# Patient Record
Sex: FEMALE | Race: WHITE | NOT HISPANIC OR LATINO | URBAN - METROPOLITAN AREA
[De-identification: names, ages, dates, MRNs, and addresses within clinical notes are randomized per-mention and may not be internally consistent; named-entity substitution may affect disease eponyms.]

---

## 2017-12-12 ENCOUNTER — OFFICE VISIT (OUTPATIENT)
Dept: OBSTETRICS AND GYNECOLOGY | Facility: CLINIC | Age: 32
End: 2017-12-12
Payer: COMMERCIAL

## 2017-12-12 VITALS
SYSTOLIC BLOOD PRESSURE: 114 MMHG | WEIGHT: 231.5 LBS | HEIGHT: 67 IN | DIASTOLIC BLOOD PRESSURE: 82 MMHG | BODY MASS INDEX: 36.34 KG/M2

## 2017-12-12 DIAGNOSIS — Z30.011 ENCOUNTER FOR INITIAL PRESCRIPTION OF CONTRACEPTIVE PILLS: ICD-10-CM

## 2017-12-12 DIAGNOSIS — Z01.419 WELL WOMAN EXAM WITH ROUTINE GYNECOLOGICAL EXAM: Primary | ICD-10-CM

## 2017-12-12 DIAGNOSIS — Z11.3 SCREENING EXAMINATION FOR STD (SEXUALLY TRANSMITTED DISEASE): ICD-10-CM

## 2017-12-12 LAB
CANDIDA RRNA VAG QL PROBE: NEGATIVE
G VAGINALIS RRNA GENITAL QL PROBE: NEGATIVE
T VAGINALIS RRNA GENITAL QL PROBE: NEGATIVE

## 2017-12-12 PROCEDURE — 99999 PR PBB SHADOW E&M-NEW PATIENT-LVL II: CPT | Mod: PBBFAC,,, | Performed by: OBSTETRICS & GYNECOLOGY

## 2017-12-12 PROCEDURE — 87660 TRICHOMONAS VAGIN DIR PROBE: CPT

## 2017-12-12 PROCEDURE — 87591 N.GONORRHOEAE DNA AMP PROB: CPT

## 2017-12-12 PROCEDURE — 99395 PREV VISIT EST AGE 18-39: CPT | Mod: S$GLB,,, | Performed by: OBSTETRICS & GYNECOLOGY

## 2017-12-12 PROCEDURE — 87480 CANDIDA DNA DIR PROBE: CPT

## 2017-12-12 RX ORDER — NORGESTIMATE AND ETHINYL ESTRADIOL 0.25-0.035
1 KIT ORAL DAILY
Qty: 28 TABLET | Refills: 12 | Status: SHIPPED | OUTPATIENT
Start: 2017-12-12 | End: 2018-09-07 | Stop reason: SDUPTHER

## 2017-12-12 NOTE — PROGRESS NOTES
Subjective:       Patient ID: Linda Will is a 32 y.o. female.    Chief Complaint:  Contraception and Well Woman      History of Present Illness:  HPI  SUBJECTIVE:   32 y.o. female  here for annual.     She describes her periods as regular, lasting 5 days. normal flow.  denies break through bleeding.   denies vaginal itching or irritation.  denies vaginal discharge.    She is sexually active with 1 partner.  She uses oral contraceptives (estrogen/progesterone) for contraception.     History of abnormal pap: No  Last Pap: [facility] -   Last MMG: No  Last Colonoscopy:  No    FH: Denies family history of breast, GYN or colon cancer.    GYN & OB History  Patient's last menstrual period was 2017.   Date of Last Pap: No result found    OB History    Para Term  AB Living   0 0 0 0 0 0   SAB TAB Ectopic Multiple Live Births   0 0 0 0 0             Review of Systems  Review of Systems   Constitutional: Negative for chills, diaphoresis, fatigue and fever.   Respiratory: Negative for cough and shortness of breath.    Cardiovascular: Negative for chest pain and palpitations.   Gastrointestinal: Negative for abdominal pain, constipation, diarrhea, nausea and vomiting.   Genitourinary: Negative for dyspareunia, menstrual problem, pelvic pain, vaginal bleeding, vaginal discharge and vaginal pain.   Neurological: Negative for headaches.   Psychiatric/Behavioral: Negative for depression.   Breast: Negative for breast mass, breast pain, nipple discharge and skin changes          Objective:    Physical Exam:   Constitutional: She is oriented to person, place, and time. She appears well-developed and well-nourished. No distress.    HENT:   Head: Normocephalic and atraumatic.     Neck: Normal range of motion.     Pulmonary/Chest: Effort normal. She exhibits no mass and no tenderness. Right breast exhibits no inverted nipple, no mass, no nipple discharge, no skin change, no  tenderness and no swelling. Left breast exhibits no inverted nipple, no mass, no nipple discharge, no skin change, no tenderness and no swelling. Breasts are symmetrical.        Abdominal: Soft. She exhibits no distension. There is no tenderness.     Genitourinary:   Genitourinary Comments: Normal external female genitalia; vagina rugated, normal; cervix normal, no masses; uterus small mobile nontender; no adnexal masses palpated.           Musculoskeletal: Normal range of motion and moves all extremeties.       Neurological: She is alert and oriented to person, place, and time.    Skin: Skin is warm.    Psychiatric: She has a normal mood and affect. Her behavior is normal. Judgment and thought content normal.          Assessment:        1. Well woman exam with routine gynecological exam    2. Encounter for initial prescription of contraceptive pills    3. Screening examination for STD (sexually transmitted disease)             Plan:      Linda was seen today for contraception and well woman.    Diagnoses and all orders for this visit:    Well woman exam with routine gynecological exam  - Pap up to date per patient. Discussed screening guidelines and recommended pap/cotesting at next annual.   - MMG age 40.   - Contraception - Contraception options discussed including OCPs, Depo Provera, vaginal ring, hormone patch, IUD, Nexplanon. Patient would like to continue continuous OCPs. Rx sent to pharmacy.   - STD screening done. Patient declines HIV, RPR, hep.    Encounter for initial prescription of contraceptive pills    Screening examination for STD (sexually transmitted disease)  -     Vaginosis Screen by DNA Probe  -     C. trachomatis/N. gonorrhoeae by AMP DNA Cervix    Other orders  -     norgestimate-ethinyl estradiol (ORTHO-CYCLEN) 0.25-35 mg-mcg per tablet; Take 1 tablet by mouth once daily. Take hormone pills continuously.      Orders Placed This Encounter   Procedures    Vaginosis Screen by DNA Probe    C.  trachomatis/N. gonorrhoeae by AMP DNA Cervix       Return in about 1 year (around 12/12/2018) for annual.         Ayaka Chin MD  OB/GYN

## 2017-12-13 LAB
C TRACH DNA SPEC QL NAA+PROBE: NOT DETECTED
N GONORRHOEA DNA SPEC QL NAA+PROBE: NOT DETECTED

## 2018-09-07 RX ORDER — NORGESTIMATE AND ETHINYL ESTRADIOL 0.25-0.035
1 KIT ORAL DAILY
Qty: 28 TABLET | Refills: 6 | Status: SHIPPED | OUTPATIENT
Start: 2018-09-07 | End: 2019-02-16 | Stop reason: SDUPTHER

## 2018-10-30 ENCOUNTER — TELEPHONE (OUTPATIENT)
Dept: OBSTETRICS AND GYNECOLOGY | Facility: CLINIC | Age: 33
End: 2018-10-30

## 2018-10-30 NOTE — TELEPHONE ENCOUNTER
----- Message from Adriane Killian sent at 10/30/2018 11:35 AM CDT -----  Contact: Self  Pt is calling to be scheduled for an appt for STD Testing.   was unable to make the appt due to an exhausted template.    She can be reached at [phone].    Thank you.

## 2019-02-11 RX ORDER — NORGESTIMATE AND ETHINYL ESTRADIOL 0.25-0.035
1 KIT ORAL DAILY
Qty: 28 TABLET | Refills: 0 | Status: SHIPPED | OUTPATIENT
Start: 2019-02-11 | End: 2019-03-20

## 2019-02-16 RX ORDER — NORGESTIMATE AND ETHINYL ESTRADIOL 0.25-0.035
KIT ORAL
Qty: 28 TABLET | Refills: 6 | Status: SHIPPED | OUTPATIENT
Start: 2019-02-16 | End: 2019-03-20 | Stop reason: SDUPTHER

## 2019-03-20 ENCOUNTER — OFFICE VISIT (OUTPATIENT)
Dept: OBSTETRICS AND GYNECOLOGY | Facility: CLINIC | Age: 34
End: 2019-03-20
Payer: COMMERCIAL

## 2019-03-20 VITALS
SYSTOLIC BLOOD PRESSURE: 136 MMHG | HEIGHT: 68 IN | WEIGHT: 252.19 LBS | DIASTOLIC BLOOD PRESSURE: 80 MMHG | BODY MASS INDEX: 38.22 KG/M2

## 2019-03-20 DIAGNOSIS — Z01.419 WELL WOMAN EXAM WITH ROUTINE GYNECOLOGICAL EXAM: Primary | ICD-10-CM

## 2019-03-20 DIAGNOSIS — Z30.41 ENCOUNTER FOR SURVEILLANCE OF CONTRACEPTIVE PILLS: ICD-10-CM

## 2019-03-20 PROCEDURE — 99395 PR PREVENTIVE VISIT,EST,18-39: ICD-10-PCS | Mod: S$GLB,,, | Performed by: OBSTETRICS & GYNECOLOGY

## 2019-03-20 PROCEDURE — 99395 PREV VISIT EST AGE 18-39: CPT | Mod: S$GLB,,, | Performed by: OBSTETRICS & GYNECOLOGY

## 2019-03-20 PROCEDURE — 88175 CYTOPATH C/V AUTO FLUID REDO: CPT

## 2019-03-20 PROCEDURE — 87624 HPV HI-RISK TYP POOLED RSLT: CPT

## 2019-03-20 RX ORDER — NORGESTIMATE AND ETHINYL ESTRADIOL 0.25-0.035
1 KIT ORAL DAILY
Qty: 84 TABLET | Refills: 3 | Status: SHIPPED | OUTPATIENT
Start: 2019-03-20

## 2019-03-20 NOTE — PROGRESS NOTES
Subjective:       Patient ID: Linda Will is a 33 y.o. female.    Chief Complaint:  Annual Exam    History of Present Illness:  HPI  SUBJECTIVE:   33 y.o. female  here for annual. Planning to quit smoking on .    She describes her periods as regular, lasting 5 days, normal flow.  denies break through bleeding.   denies vaginal itching or irritation.  denies vaginal discharge.    She is sexually active with 1 partner.  She uses oral contraceptives (estrogen/progesterone) for contraception.     History of abnormal pap: No  Last Pap: [facility] -   Last MMG: No  Last Colonoscopy:  No    FH: Denies family history of breast, GYN or colon cancer.    GYN & OB History  Patient's last menstrual period was 03/15/2019.   Date of Last Pap: No result found    OB History    Para Term  AB Living   0 0 0 0 0 0   SAB TAB Ectopic Multiple Live Births   0 0 0 0 0             Review of Systems  Review of Systems   Constitutional: Negative for chills, diaphoresis, fatigue and fever.   Respiratory: Negative for cough and shortness of breath.    Cardiovascular: Negative for chest pain and palpitations.   Gastrointestinal: Negative for abdominal pain, constipation, diarrhea, nausea and vomiting.   Genitourinary: Negative for dyspareunia, menstrual problem, pelvic pain, vaginal bleeding, vaginal discharge and vaginal pain.   Integumentary:  Negative for breast mass, nipple discharge and breast skin changes.   Neurological: Negative for headaches.   Psychiatric/Behavioral: Negative for depression.   Breast: Negative for mass, mastodynia, nipple discharge and skin changes          Objective:    Physical Exam:   Constitutional: She is oriented to person, place, and time. She appears well-developed and well-nourished. No distress.    HENT:   Head: Normocephalic and atraumatic.     Neck: Normal range of motion.     Pulmonary/Chest: Effort normal. She exhibits no mass and no tenderness.  Right breast exhibits no inverted nipple, no mass, no nipple discharge, no skin change, no tenderness and no swelling. Left breast exhibits no inverted nipple, no mass, no nipple discharge, no skin change, no tenderness and no swelling. Breasts are symmetrical.        Abdominal: Soft. She exhibits no distension. There is no tenderness.     Genitourinary:   Genitourinary Comments: Normal external female genitalia; vagina rugated, normal; cervix normal, no masses; uterus small mobile nontender; no adnexal masses palpated.           Musculoskeletal: Normal range of motion and moves all extremeties.       Neurological: She is alert and oriented to person, place, and time.    Skin: Skin is warm.    Psychiatric: She has a normal mood and affect. Her behavior is normal. Judgment and thought content normal.          Assessment:        1. Well woman exam with routine gynecological exam    2. Encounter for surveillance of contraceptive pills             Plan:      Linda was seen today for contraception and well woman.    Diagnoses and all orders for this visit:    Well woman exam with routine gynecological exam  - Discussed pap guidelines. Pap/HPV collected.   - MMG age 40.   - Contraception - Continue OCPs.  - STD screening done recently per patient.    Other orders  -     norgestimate-ethinyl estradiol (ORTHO-CYCLEN) 0.25-35 mg-mcg per tablet; Take 1 tablet by mouth once daily. Take hormone pills continuously.    Orders Placed This Encounter   Procedures    HPV High Risk Genotypes, PCR       Follow-up in about 1 year (around 3/20/2020) for annual.

## 2019-03-25 LAB
HPV HR 12 DNA CVX QL NAA+PROBE: POSITIVE
HPV16 AG SPEC QL: POSITIVE
HPV18 DNA SPEC QL NAA+PROBE: NEGATIVE

## 2019-03-26 ENCOUNTER — PATIENT MESSAGE (OUTPATIENT)
Dept: OBSTETRICS AND GYNECOLOGY | Facility: CLINIC | Age: 34
End: 2019-03-26

## 2019-03-26 DIAGNOSIS — B37.31 YEAST VAGINITIS: Primary | ICD-10-CM

## 2019-03-26 DIAGNOSIS — N76.0 BV (BACTERIAL VAGINOSIS): ICD-10-CM

## 2019-03-26 DIAGNOSIS — B96.89 BV (BACTERIAL VAGINOSIS): ICD-10-CM

## 2019-03-26 RX ORDER — FLUCONAZOLE 150 MG/1
150 TABLET ORAL ONCE
Qty: 1 TABLET | Refills: 2 | Status: SHIPPED | OUTPATIENT
Start: 2019-03-26 | End: 2019-03-26

## 2019-03-26 RX ORDER — METRONIDAZOLE 500 MG/1
500 TABLET ORAL EVERY 12 HOURS
Qty: 14 TABLET | Refills: 0 | Status: SHIPPED | OUTPATIENT
Start: 2019-03-26 | End: 2019-04-02

## 2019-03-27 ENCOUNTER — PATIENT MESSAGE (OUTPATIENT)
Dept: OBSTETRICS AND GYNECOLOGY | Facility: CLINIC | Age: 34
End: 2019-03-27

## 2019-04-04 ENCOUNTER — PROCEDURE VISIT (OUTPATIENT)
Dept: OBSTETRICS AND GYNECOLOGY | Facility: CLINIC | Age: 34
End: 2019-04-04
Payer: COMMERCIAL

## 2019-04-04 VITALS
WEIGHT: 258.63 LBS | SYSTOLIC BLOOD PRESSURE: 110 MMHG | HEIGHT: 68 IN | BODY MASS INDEX: 39.2 KG/M2 | DIASTOLIC BLOOD PRESSURE: 74 MMHG

## 2019-04-04 DIAGNOSIS — Z23 NEED FOR HPV VACCINE: ICD-10-CM

## 2019-04-04 DIAGNOSIS — B97.7 HPV IN FEMALE: Primary | ICD-10-CM

## 2019-04-04 PROCEDURE — 57454 BX/CURETT OF CERVIX W/SCOPE: CPT | Mod: S$GLB,,, | Performed by: OBSTETRICS & GYNECOLOGY

## 2019-04-04 PROCEDURE — 57454 COLPOSCOPY: ICD-10-PCS | Mod: S$GLB,,, | Performed by: OBSTETRICS & GYNECOLOGY

## 2019-04-04 PROCEDURE — 88305 TISSUE SPECIMEN TO PATHOLOGY, OBSTETRICS/GYNECOLOGY: ICD-10-PCS | Mod: 26,,, | Performed by: PATHOLOGY

## 2019-04-04 PROCEDURE — 88305 TISSUE EXAM BY PATHOLOGIST: CPT | Mod: 26,,, | Performed by: PATHOLOGY

## 2019-04-04 PROCEDURE — 88305 TISSUE EXAM BY PATHOLOGIST: CPT | Performed by: PATHOLOGY

## 2019-04-04 RX ORDER — FLUCONAZOLE 150 MG/1
TABLET ORAL
Refills: 2 | COMMUNITY
Start: 2019-03-28

## 2019-04-04 NOTE — PROCEDURES
Colposcopy  Date/Time: 4/4/2019 2:47 PM  Performed by: Ayaka Chin MD  Authorized by: Ayaka Chin MD     Consent Done?:  Yes (Written)  Assistants?: Yes    List of assistants:  Cheri ALEGRIA was present for the entire procedure.    Colposcopy Site:  Cervix  Position:  Supine  Acrowhite Lesion? Yes (1:00)    Atypical Vessels? Yes (12:00 and 4:00)    Transformation Zone Adequate?: Yes    Biopsy?: Yes         Location:  Cervix ((4 00, 1 00 and 11 00))  ECC Performed?: Yes    LEEP Performed?: No    Estimated blood loss (cc):  10   Patient tolerated the procedure well with no immediate complications.   Post-operative instructions were provided for the patient.   Patient was discharged and will follow up if any complications occur

## 2019-04-05 ENCOUNTER — TELEPHONE (OUTPATIENT)
Dept: OBSTETRICS AND GYNECOLOGY | Facility: CLINIC | Age: 34
End: 2019-04-05

## 2019-04-05 ENCOUNTER — PATIENT MESSAGE (OUTPATIENT)
Dept: OBSTETRICS AND GYNECOLOGY | Facility: CLINIC | Age: 34
End: 2019-04-05

## 2019-04-05 DIAGNOSIS — B97.7 HPV (HUMAN PAPILLOMA VIRUS) INFECTION: Primary | ICD-10-CM

## 2019-04-12 ENCOUNTER — TELEPHONE (OUTPATIENT)
Dept: OBSTETRICS AND GYNECOLOGY | Facility: CLINIC | Age: 34
End: 2019-04-12

## 2019-04-12 NOTE — TELEPHONE ENCOUNTER
Spoke with patient about TAWANNA III colpo result. Recommended in office LEEP. Reviewed procedure and all questions answered. Scheduled for 4/18 at 915 am.

## 2019-04-16 ENCOUNTER — PATIENT MESSAGE (OUTPATIENT)
Dept: OBSTETRICS AND GYNECOLOGY | Facility: CLINIC | Age: 34
End: 2019-04-16

## 2019-04-18 ENCOUNTER — PROCEDURE VISIT (OUTPATIENT)
Dept: OBSTETRICS AND GYNECOLOGY | Facility: CLINIC | Age: 34
End: 2019-04-18
Payer: COMMERCIAL

## 2019-04-18 VITALS
WEIGHT: 255.5 LBS | SYSTOLIC BLOOD PRESSURE: 124 MMHG | DIASTOLIC BLOOD PRESSURE: 78 MMHG | BODY MASS INDEX: 38.72 KG/M2 | HEIGHT: 68 IN

## 2019-04-18 DIAGNOSIS — D06.9 CIN III (CERVICAL INTRAEPITHELIAL NEOPLASIA GRADE III) WITH SEVERE DYSPLASIA: ICD-10-CM

## 2019-04-18 DIAGNOSIS — D06.9 SEVERE DYSPLASIA OF CERVIX (CIN III): Primary | ICD-10-CM

## 2019-04-18 LAB
B-HCG UR QL: NEGATIVE
CTP QC/QA: YES

## 2019-04-18 PROCEDURE — 81025 URINE PREGNANCY TEST: CPT | Mod: S$GLB,,, | Performed by: OBSTETRICS & GYNECOLOGY

## 2019-04-18 PROCEDURE — 81025 POCT URINE PREGNANCY: ICD-10-PCS | Mod: S$GLB,,, | Performed by: OBSTETRICS & GYNECOLOGY

## 2019-04-18 PROCEDURE — 88307 TISSUE SPECIMEN TO PATHOLOGY, OBSTETRICS/GYNECOLOGY: ICD-10-PCS | Mod: 26,,, | Performed by: PATHOLOGY

## 2019-04-18 PROCEDURE — 88305 TISSUE SPECIMEN TO PATHOLOGY, OBSTETRICS/GYNECOLOGY: ICD-10-PCS | Mod: 26,,, | Performed by: PATHOLOGY

## 2019-04-18 PROCEDURE — 88305 TISSUE EXAM BY PATHOLOGIST: CPT | Performed by: PATHOLOGY

## 2019-04-18 PROCEDURE — 88305 TISSUE EXAM BY PATHOLOGIST: CPT | Mod: 26,,, | Performed by: PATHOLOGY

## 2019-04-18 PROCEDURE — 57461 PR COLPOSCOPY,CERVIX W/ADJ VAG,W/LOOP CONIZ: ICD-10-PCS | Mod: S$GLB,,, | Performed by: OBSTETRICS & GYNECOLOGY

## 2019-04-18 PROCEDURE — 57461 CONZ OF CERVIX W/SCOPE LEEP: CPT | Mod: S$GLB,,, | Performed by: OBSTETRICS & GYNECOLOGY

## 2019-04-18 PROCEDURE — 88307 TISSUE EXAM BY PATHOLOGIST: CPT | Mod: 26,,, | Performed by: PATHOLOGY

## 2019-04-21 NOTE — PROCEDURES
LEEP PROCEDURE:    33 year old female patient  with Patient's last menstrual period was 2019 (exact date). is here for LEEP.    DATA REVIEWED:  Last Pap Date: 3/20/19      Pap Result: NILM +HPV 16 and other high risk types  Last Colposcopy biopsy result : TAWANNA 3    PRE- LEEP PROCEDURE COUNSELING:  Risks of infection, bleeding, pain, injury to adjacent organs as well as future cervicalincompetence.  That this procedure does not guarantee to completely remove all abnormal cells and that dysplasia may reoccur in the future.  Alternatives to the LEEP procedure were discussed and the patient agrees to proceed.    PROCEDURE:  TIME OUT PERFORMED.  The cervix and transformation zone were visualized with a speculum and a local block was obtained with 15 cc of 1% lidocaine.  A large narrow size loop was selected.  The entire transformation zone was excised in one pass.  The base was cauterized with a ball cautery.  Monsels solution was applied to the base to obtain hemostasis and the speculum removed.  The specimen was placed in formalyn and sent to Pathology for a Histopathologic diagnosis. The patient tolerated the procedure well.    ASSESSMENT:  1.   Encounter Diagnoses   Name Primary?    Severe dysplasia of cervix (TAWANNA III) Yes    TAWANNA III (cervical intraepithelial neoplasia grade III) with severe dysplasia        POST LEEP PROCEDURE COUNSELING:  Manage post LEEP cramping with NSAIDs or Tylenol or Rx per [facility].  Avoid anything in vagina (intercourse, douching, tampons) two weeks after the Procedure.  Expect a watery grey to yellow vaginal discharge for several weeks.  Limit activity for 2 weeks.  Report bleeding heavier than a period, worsening pain, fever > 101.0 F, or foul-smelling vaginal discharge.  Importance of follow-up stressed.    Counseling lasted approximately 15 minutes and all her questions were answered.    FOLLOW-UP based on pathology results.

## 2019-04-26 ENCOUNTER — PATIENT MESSAGE (OUTPATIENT)
Dept: OBSTETRICS AND GYNECOLOGY | Facility: CLINIC | Age: 34
End: 2019-04-26

## 2019-05-01 ENCOUNTER — CLINICAL SUPPORT (OUTPATIENT)
Dept: OBSTETRICS AND GYNECOLOGY | Facility: CLINIC | Age: 34
End: 2019-05-01
Payer: COMMERCIAL

## 2019-05-01 PROCEDURE — 90651 9VHPV VACCINE 2/3 DOSE IM: CPT | Mod: S$GLB,,, | Performed by: OBSTETRICS & GYNECOLOGY

## 2019-05-01 PROCEDURE — 90471 IMMUNIZATION ADMIN: CPT | Mod: S$GLB,,, | Performed by: OBSTETRICS & GYNECOLOGY

## 2019-05-01 PROCEDURE — 99999 PR PBB SHADOW E&M-EST. PATIENT-LVL II: CPT | Mod: PBBFAC,,,

## 2019-05-01 PROCEDURE — 99999 PR PBB SHADOW E&M-EST. PATIENT-LVL II: ICD-10-PCS | Mod: PBBFAC,,,

## 2019-05-01 PROCEDURE — 90651 HPV VACCINE 9-VALENT 3 DOSE IM: ICD-10-PCS | Mod: S$GLB,,, | Performed by: OBSTETRICS & GYNECOLOGY

## 2019-05-01 PROCEDURE — 90471 HPV VACCINE 9-VALENT 3 DOSE IM: ICD-10-PCS | Mod: S$GLB,,, | Performed by: OBSTETRICS & GYNECOLOGY

## 2019-05-01 NOTE — PROGRESS NOTES
Here for Gardasil # 1 injection, without complaint at this time. Reports no pain before or after injection. Advised to wait in lobby 15 minutes after injection and report any adverse reactions. Return to clinic in          for next injection.         Site: LD

## 2019-05-16 ENCOUNTER — IMMUNIZATION (OUTPATIENT)
Dept: PHARMACY | Facility: CLINIC | Age: 34
End: 2019-05-16
Payer: COMMERCIAL

## 2019-05-16 ENCOUNTER — OFFICE VISIT (OUTPATIENT)
Dept: INTERNAL MEDICINE | Facility: CLINIC | Age: 34
End: 2019-05-16
Payer: COMMERCIAL

## 2019-05-16 VITALS
HEART RATE: 75 BPM | OXYGEN SATURATION: 99 % | DIASTOLIC BLOOD PRESSURE: 80 MMHG | BODY MASS INDEX: 39.42 KG/M2 | HEIGHT: 68 IN | WEIGHT: 260.13 LBS | TEMPERATURE: 98 F | SYSTOLIC BLOOD PRESSURE: 120 MMHG

## 2019-05-16 DIAGNOSIS — R06.83 SNORING: ICD-10-CM

## 2019-05-16 DIAGNOSIS — Z87.891 QUIT SMOKING WITHIN PAST YEAR: ICD-10-CM

## 2019-05-16 DIAGNOSIS — Z00.00 ANNUAL PHYSICAL EXAM: Primary | ICD-10-CM

## 2019-05-16 DIAGNOSIS — Z71.85 VACCINE COUNSELING: ICD-10-CM

## 2019-05-16 DIAGNOSIS — E66.9 OBESITY (BMI 35.0-39.9 WITHOUT COMORBIDITY): ICD-10-CM

## 2019-05-16 PROCEDURE — 99999 PR PBB SHADOW E&M-EST. PATIENT-LVL IV: CPT | Mod: PBBFAC,,, | Performed by: INTERNAL MEDICINE

## 2019-05-16 PROCEDURE — 99385 PR PREVENTIVE VISIT,NEW,18-39: ICD-10-PCS | Mod: S$GLB,,, | Performed by: INTERNAL MEDICINE

## 2019-05-16 PROCEDURE — 99385 PREV VISIT NEW AGE 18-39: CPT | Mod: S$GLB,,, | Performed by: INTERNAL MEDICINE

## 2019-05-16 PROCEDURE — 99999 PR PBB SHADOW E&M-EST. PATIENT-LVL IV: ICD-10-PCS | Mod: PBBFAC,,, | Performed by: INTERNAL MEDICINE

## 2019-05-16 NOTE — Clinical Note
Mary Mckeon is interested in working with you on sustaining the healthy changes she's been making, and continuing to work on diet and some weight loss for health

## 2019-05-16 NOTE — PROGRESS NOTES
Subjective:       Patient ID: Linda Will is a 34 y.o. female.    Chief Complaint: Establish Care    HPI  33 y/o woman with h/o cervical dysplasia here to establish care. 10 yrs since last regular primary care. No acute concerns. No other medical history.     Recently had LEEP done for TAWANNA III cervical dysplasia, results negative, returning for follow up with Gyn in a few weeks.    Several family members with mental health issues (mother, sister, MGM, great-aunt) but she does not have any significant depression, anxiety, or other issues  Father with sleep apnea    STI screening done 12/2018 - negative    Recently quit smoking! Doing well with this.  Has also been making healthy diet/exercise changes; would like to lose weight and overall feel healthier, have more energy.  Exercising - as of about a month ago exercising almost every day, has stationary bike, does some yoga   Diet - soups, salad, sandwiches, pizza, lots of fruits/veg, nuts - generally healthy diet  Sleep - generally fine, has been told that she snores sometimes, does sometimes feel tired.    Last tetanus vaccine age 19 - due for this    Works in lighting at [employer]    Review of Systems   Constitutional: Positive for activity change. Negative for unexpected weight change.   HENT: Negative for hearing loss, rhinorrhea and trouble swallowing.    Eyes: Negative for discharge and visual disturbance.   Respiratory: Negative for cough, chest tightness, shortness of breath and wheezing.    Cardiovascular: Negative for chest pain, palpitations and leg swelling.   Gastrointestinal: Negative for abdominal pain, blood in stool, constipation, diarrhea and vomiting.   Endocrine: Negative for polydipsia and polyuria.   Genitourinary: Negative for difficulty urinating, dysuria, hematuria and menstrual problem.   Musculoskeletal: Negative for arthralgias, joint swelling and neck pain.   Skin: Negative for rash.   Neurological: Negative for weakness and  "headaches.   Psychiatric/Behavioral: Negative for confusion and dysphoric mood. Sleep disturbance: snoring.         Past Medical History:   Diagnosis Date    HPV (human papilloma virus) infection      Past Surgical History:   Procedure Laterality Date    CERVICAL BIOPSY  W/ LOOP ELECTRODE EXCISION  2019    Severe Dysplasia    WISDOM TOOTH EXTRACTION       Family History   Problem Relation Age of Onset    Hypertension Mother     Depression Mother     Diabetes Sister     Sleep apnea Father     Thyroid disease Sister     Depression Sister     Depression Maternal Grandmother     Breast cancer Neg Hx     Colon cancer Neg Hx     Eclampsia Neg Hx     Stroke Neg Hx     Miscarriages / Stillbirths Neg Hx     Ovarian cancer Neg Hx     Cancer Neg Hx     Heart disease Neg Hx     Kidney disease Neg Hx        Social History     Tobacco Use    Smoking status: Former Smoker     Packs/day: 1.00     Years: 10.00     Pack years: 10.00     Types: Cigarettes     Last attempt to quit: 2019     Years since quittin.1    Smokeless tobacco: Never Used    Tobacco comment: quit using candy cigarettes, didn't use NRT or chantix   Substance Use Topics    Alcohol use: Yes     Frequency: 2-4 times a month     Drinks per session: 3 or 4     Binge frequency: Less than monthly     Comment: usually about 2 drinks a week, sometimes 4-5 drinks if going out    Drug use: No       Medications and allergies reviewed.     Objective:          Vitals:    19 1443   BP: 120/80   BP Location: Left arm   Patient Position: Sitting   Pulse: 75   Temp: 98.4 °F (36.9 °C)   TempSrc: Oral   SpO2: 99%   Weight: 118 kg (260 lb 2.3 oz)   Height: 5' 8" (1.727 m)     Body mass index is 39.55 kg/m².  Physical Exam   Constitutional: She is oriented to person, place, and time. She appears well-developed and well-nourished. No distress.   Pleasant healthy woman, comfortable and conversant   HENT:   Head: Normocephalic and atraumatic. "   Mouth/Throat: Oropharynx is clear and moist.   Eyes: Pupils are equal, round, and reactive to light. Conjunctivae and EOM are normal. No scleral icterus.   Neck: Neck supple.   Cardiovascular: Normal rate, regular rhythm and normal heart sounds.   No murmur heard.  Pulmonary/Chest: Effort normal and breath sounds normal. No respiratory distress.   Abdominal: Soft. Bowel sounds are normal. She exhibits no distension. There is no tenderness.   Musculoskeletal: She exhibits no edema or tenderness.   Lymphadenopathy:     She has no cervical adenopathy.   Neurological: She is alert and oriented to person, place, and time. No cranial nerve deficit. Gait normal.   Skin: Skin is warm and dry.   Psychiatric: She has a normal mood and affect. Her behavior is normal.   Vitals reviewed.      No labs in system    Assessment:       1. Annual physical exam    2. Vaccine counseling    3. Snoring    4. Quit smoking within past year    5. Obesity (BMI 35.0-39.9 without comorbidity)        Plan:   Linda was seen today for [facility] care.    Diagnoses and all orders for this visit:    Annual physical exam - Overall healthy, doing well. Reviewed chronic and preventive health concerns.  -     CBC Without Differential; Future  -     Comprehensive metabolic panel; Future  -     Lipid panel; Future  -     TSH; Future  -     Hemoglobin A1c; Future    Vaccine counseling - would like to know if needs MMR booster  -     Rubella antibody, IgG; Future  -     Rubeola antibody IgG; Future  -     Mumps, IgG Screen; Future    Snoring - working on weight loss; will follow up on this at future visits. Discussed mechanics of sleep apnea, health risks.     Health maintenance reviewed with patient.   Tdap given  Labs in the next week  Contact info for health  given - referral sent    Follow up in about 6 months (around 11/16/2019) for follow up .    Yamil Gomez MD  Internal Medicine  [facility]  5/16/2019

## 2019-05-17 ENCOUNTER — TELEPHONE (OUTPATIENT)
Dept: INTERNAL MEDICINE | Facility: CLINIC | Age: 34
End: 2019-05-17

## 2019-05-17 NOTE — TELEPHONE ENCOUNTER
Patient referred by Dr. Gomez for Health coaching (weight loss, lifestyle changes).  Called patient and gave an explanation about Health Coaching program and invited participation.   Patient states she would like to participate.  Set appointment for 6/7/19 at 1300.   Gave direct contact number to call if she has any questions [phone].   Mary Ang RN  Health

## 2019-05-17 NOTE — TELEPHONE ENCOUNTER
----- Message from Yamil Gomez MD sent at 5/16/2019  3:28 PM CDT -----  Mary Mumtaz Linda is interested in working with you on sustaining the healthy changes she's been making, and continuing to work on diet and some weight loss for health

## 2019-05-25 ENCOUNTER — LAB VISIT (OUTPATIENT)
Dept: LAB | Facility: HOSPITAL | Age: 34
End: 2019-05-25
Attending: INTERNAL MEDICINE
Payer: COMMERCIAL

## 2019-05-25 DIAGNOSIS — Z71.85 VACCINE COUNSELING: ICD-10-CM

## 2019-05-25 DIAGNOSIS — Z00.00 ANNUAL PHYSICAL EXAM: ICD-10-CM

## 2019-05-25 LAB
ALBUMIN SERPL BCP-MCNC: 4 G/DL (ref 3.5–5.2)
ALP SERPL-CCNC: 72 U/L (ref 55–135)
ALT SERPL W/O P-5'-P-CCNC: 25 U/L (ref 10–44)
ANION GAP SERPL CALC-SCNC: 7 MMOL/L (ref 8–16)
AST SERPL-CCNC: 22 U/L (ref 10–40)
BILIRUB SERPL-MCNC: 0.5 MG/DL (ref 0.1–1)
BUN SERPL-MCNC: 10 MG/DL (ref 6–20)
CALCIUM SERPL-MCNC: 9.4 MG/DL (ref 8.7–10.5)
CHLORIDE SERPL-SCNC: 105 MMOL/L (ref 95–110)
CHOLEST SERPL-MCNC: 103 MG/DL (ref 120–199)
CHOLEST/HDLC SERPL: 2 {RATIO} (ref 2–5)
CO2 SERPL-SCNC: 26 MMOL/L (ref 23–29)
CREAT SERPL-MCNC: 1 MG/DL (ref 0.5–1.4)
ERYTHROCYTE [DISTWIDTH] IN BLOOD BY AUTOMATED COUNT: 12.3 % (ref 11.5–14.5)
EST. GFR  (AFRICAN AMERICAN): >60 ML/MIN/1.73 M^2
EST. GFR  (NON AFRICAN AMERICAN): >60 ML/MIN/1.73 M^2
ESTIMATED AVG GLUCOSE: 88 MG/DL (ref 68–131)
GLUCOSE SERPL-MCNC: 85 MG/DL (ref 70–110)
HBA1C MFR BLD HPLC: 4.7 % (ref 4–5.6)
HCT VFR BLD AUTO: 43 % (ref 37–48.5)
HDLC SERPL-MCNC: 51 MG/DL (ref 40–75)
HDLC SERPL: 49.5 % (ref 20–50)
HGB BLD-MCNC: 14.5 G/DL (ref 12–16)
LDLC SERPL CALC-MCNC: 35.6 MG/DL (ref 63–159)
MCH RBC QN AUTO: 31.7 PG (ref 27–31)
MCHC RBC AUTO-ENTMCNC: 33.7 G/DL (ref 32–36)
MCV RBC AUTO: 94 FL (ref 82–98)
NONHDLC SERPL-MCNC: 52 MG/DL
PLATELET # BLD AUTO: 251 K/UL (ref 150–350)
PMV BLD AUTO: 9.9 FL (ref 9.2–12.9)
POTASSIUM SERPL-SCNC: 4.2 MMOL/L (ref 3.5–5.1)
PROT SERPL-MCNC: 7.9 G/DL (ref 6–8.4)
RBC # BLD AUTO: 4.57 M/UL (ref 4–5.4)
SODIUM SERPL-SCNC: 138 MMOL/L (ref 136–145)
TRIGL SERPL-MCNC: 82 MG/DL (ref 30–150)
TSH SERPL DL<=0.005 MIU/L-ACNC: 1.56 UIU/ML (ref 0.4–4)
WBC # BLD AUTO: 8.32 K/UL (ref 3.9–12.7)

## 2019-05-25 PROCEDURE — 86765 RUBEOLA ANTIBODY: CPT

## 2019-05-25 PROCEDURE — 84443 ASSAY THYROID STIM HORMONE: CPT

## 2019-05-25 PROCEDURE — 86735 MUMPS ANTIBODY: CPT

## 2019-05-25 PROCEDURE — 80053 COMPREHEN METABOLIC PANEL: CPT

## 2019-05-25 PROCEDURE — 36415 COLL VENOUS BLD VENIPUNCTURE: CPT

## 2019-05-25 PROCEDURE — 85027 COMPLETE CBC AUTOMATED: CPT

## 2019-05-25 PROCEDURE — 80061 LIPID PANEL: CPT

## 2019-05-25 PROCEDURE — 83036 HEMOGLOBIN GLYCOSYLATED A1C: CPT

## 2019-05-25 PROCEDURE — 86762 RUBELLA ANTIBODY: CPT

## 2019-05-27 LAB
MUMPS IGG INTERPRETATION: NEGATIVE
MUMPS IGG SCREEN: 0.78 ISR (ref 0–0.9)
RUBEOLA IGG ANTIBODY: 1.51 ISR (ref 0–0.9)
RUBEOLA INTERPRETATION: POSITIVE
RUBV IGG SER-ACNC: 7.7 IU/ML
RUBV IGG SER-IMP: ABNORMAL

## 2019-05-30 ENCOUNTER — CLINICAL SUPPORT (OUTPATIENT)
Dept: OBSTETRICS AND GYNECOLOGY | Facility: CLINIC | Age: 34
End: 2019-05-30
Payer: COMMERCIAL

## 2019-05-30 DIAGNOSIS — Z23 NEED FOR HPV VACCINE: Primary | ICD-10-CM

## 2019-05-30 PROCEDURE — 99999 PR PBB SHADOW E&M-EST. PATIENT-LVL I: ICD-10-PCS | Mod: PBBFAC,,,

## 2019-05-30 PROCEDURE — 99999 PR PBB SHADOW E&M-EST. PATIENT-LVL I: CPT | Mod: PBBFAC,,,

## 2019-05-30 NOTE — PROGRESS NOTES
Here for Gardasil # 2  injection, without complaint at this time. Reports no pain before or after injection. Advised to wait in lobby 15 minutes after injection and report any adverse reactions. Return to clinic in 10/2019  for next injection.       Site: LD

## 2019-06-07 ENCOUNTER — OFFICE VISIT (OUTPATIENT)
Dept: OBSTETRICS AND GYNECOLOGY | Facility: CLINIC | Age: 34
End: 2019-06-07
Payer: COMMERCIAL

## 2019-06-07 VITALS
DIASTOLIC BLOOD PRESSURE: 72 MMHG | WEIGHT: 257.5 LBS | BODY MASS INDEX: 39.03 KG/M2 | SYSTOLIC BLOOD PRESSURE: 110 MMHG | HEIGHT: 68 IN

## 2019-06-07 DIAGNOSIS — D06.9 CIN III (CERVICAL INTRAEPITHELIAL NEOPLASIA GRADE III) WITH SEVERE DYSPLASIA: Primary | ICD-10-CM

## 2019-06-07 PROCEDURE — 99024 POSTOP FOLLOW-UP VISIT: CPT | Mod: S$GLB,,, | Performed by: OBSTETRICS & GYNECOLOGY

## 2019-06-07 PROCEDURE — 99999 PR PBB SHADOW E&M-EST. PATIENT-LVL III: ICD-10-PCS | Mod: PBBFAC,,, | Performed by: OBSTETRICS & GYNECOLOGY

## 2019-06-07 PROCEDURE — 99024 PR POST-OP FOLLOW-UP VISIT: ICD-10-PCS | Mod: S$GLB,,, | Performed by: OBSTETRICS & GYNECOLOGY

## 2019-06-07 PROCEDURE — 99999 PR PBB SHADOW E&M-EST. PATIENT-LVL III: CPT | Mod: PBBFAC,,, | Performed by: OBSTETRICS & GYNECOLOGY

## 2019-06-08 NOTE — PROGRESS NOTES
Subjective:       Patient ID: Linda Will is a 34 y.o. female.    Chief Complaint:  Follow-up    History of Present Illness:  Gynecologic Exam   The patient's pertinent negatives include no genital itching, genital lesions, genital odor, genital rash, missed menses, pelvic pain, vaginal bleeding or vaginal discharge. She is not pregnant. Pertinent negatives include no abdominal pain, anorexia, back pain, chills, constipation, diarrhea, discolored urine, dysuria, fever, flank pain, frequency, headaches, hematuria, nausea, painful intercourse, rash, urgency or vomiting. She is sexually active. No, her partner does not have an STD. She uses oral contraceptives for contraception. Her menstrual history has been regular. Her past medical history is significant for an STD. There is no history of an abdominal surgery, a  section, an ectopic pregnancy, endometriosis, a gynecological surgery, herpes simplex, menorrhagia, metrorrhagia, miscarriage, ovarian cysts, perineal abscess, PID, a terminated pregnancy or vaginosis.     Postoperative Follow-up  Patient presents to the clinic 6 weeks status post LEEP for TAWANNA III. Eating a regular diet without difficulty. Bowel movements are normal. The patient is not having any pain. Reports discharge for about a week that has now resolved. She is currently undergoing HPV vaccine series.     Pap history:  2019: NILM pap, +HPV 16 and other HR types  2019: Colpo ectocervix TAWANNA III, ECC neg    FINAL PATHOLOGIC DIAGNOSIS  1. LEEP SPECIMEN FROM CERVIX:  NO RESIDUAL DYSPLASIA IDENTIFIED IN THE ENTIRELY SUBMITTED SPECIMEN  CHRONIC ENDOCERVICITIS  2. ENDOCERVICAL CURETTINGS AT 2:00 A.M.:  ENDOCERVICAL GLANDULAR FRAGMENTS WITH NO DYSPLASIA IDENTIFIED      GYN & OB History  Patient's last menstrual period was 2019.   Date of Last Pap: 3/27/2019    OB History    Para Term  AB Living   0 0 0 0 0 0   SAB TAB Ectopic Multiple Live Births   0 0 0 0 0       Review of  Systems  Review of Systems   Constitutional: Negative for chills, diaphoresis, fatigue and fever.   Respiratory: Negative for cough and shortness of breath.    Cardiovascular: Negative for chest pain and palpitations.   Gastrointestinal: Negative for abdominal pain, anorexia, constipation, diarrhea, nausea and vomiting.   Genitourinary: Negative for dyspareunia, dysuria, flank pain, frequency, hematuria, menorrhagia, missed menses, pelvic pain, urgency, vaginal bleeding, vaginal discharge and vaginal pain.   Musculoskeletal: Negative for back pain.   Integumentary:  Negative for rash.   Neurological: Negative for headaches.   Psychiatric/Behavioral: Negative for depression. The patient is not nervous/anxious.            Objective:    Physical Exam:   Constitutional: She is oriented to person, place, and time. She appears well-developed and well-nourished. No distress.    HENT:   Head: Normocephalic and atraumatic.    Eyes: EOM are normal.    Neck: Normal range of motion.     Pulmonary/Chest: Effort normal.        Abdominal: Soft. She exhibits no distension, no mass and no abdominal incision. There is no tenderness. There is no rebound and no guarding. No hernia.     Genitourinary:   Genitourinary Comments: Normal external female genitalia; vagina rugated, normal; cervix normal, no masses, well-healed, os patent.           Musculoskeletal: Normal range of motion and moves all extremeties.       Neurological: She is alert and oriented to person, place, and time.    Skin: Skin is warm. No rash noted.    Psychiatric: She has a normal mood and affect. Her behavior is normal. Judgment and thought content normal.          Assessment:        1. TAWANNA III (cervical intraepithelial neoplasia grade III) with severe dysplasia               Plan:      Linda was seen today for follow-up.    Diagnoses and all orders for this visit:    TAWANNA III (cervical intraepithelial neoplasia grade III) with severe dysplasia  - Doing well  postoperatively  - Reviewed benign pathology  - Repeat cotesting 12 months  - Continue HPV series    No orders of the defined types were placed in this encounter.      Follow up in about 1 year (around 6/7/2020) for annual.

## 2019-06-15 ENCOUNTER — TELEPHONE (OUTPATIENT)
Dept: INTERNAL MEDICINE | Facility: CLINIC | Age: 34
End: 2019-06-15

## 2019-06-15 NOTE — TELEPHONE ENCOUNTER
Called pt, no answer, left Vm:    Comments on her labs are released on portal. Labs overall normal except that her vaccine titers show that she is NOT immune to mumps and has waning immunity to rubella. She does have immunity to measles.   I recommend that she come in to pharmacy here to get MMR booster vaccine.   Please call to let her know.   Yamil Gomez MD

## 2019-06-15 NOTE — PROGRESS NOTES
Comments on her labs are released on portal. Labs overall normal except that her vaccine titers show that she is NOT immune to mumps and has waning immunity to rubella. She does have immunity to measles.  I recommend that she come in to pharmacy here to get MMR booster vaccine.  Please call to let her know.  Yamil Gomez MD

## 2019-06-17 ENCOUNTER — PATIENT MESSAGE (OUTPATIENT)
Dept: INTERNAL MEDICINE | Facility: CLINIC | Age: 34
End: 2019-06-17

## 2019-06-17 DIAGNOSIS — Z23 NEED FOR MEASLES-MUMPS-RUBELLA (MMR) VACCINE: Primary | ICD-10-CM

## 2019-06-27 ENCOUNTER — IMMUNIZATION (OUTPATIENT)
Dept: PHARMACY | Facility: CLINIC | Age: 34
End: 2019-06-27
Payer: COMMERCIAL

## 2019-10-07 ENCOUNTER — TELEPHONE (OUTPATIENT)
Dept: OBSTETRICS AND GYNECOLOGY | Facility: CLINIC | Age: 34
End: 2019-10-07

## 2019-10-07 NOTE — TELEPHONE ENCOUNTER
----- Message from Madni Jones sent at 10/7/2019  9:08 AM CDT -----  Contact: ANNI GU [[id number]]  Type:  Patient Returning Call    Who Called: ANNI GU [[id number]]    Who Left Message for Patient: Cheri PROCTOR     Does the patient know what this is regarding?: yes    Best Call Back Number: [phone]    Additional Information: can be reached through the portal as well

## 2020-02-05 ENCOUNTER — PATIENT MESSAGE (OUTPATIENT)
Dept: OBSTETRICS AND GYNECOLOGY | Facility: CLINIC | Age: 35
End: 2020-02-05

## 2020-10-05 ENCOUNTER — PATIENT MESSAGE (OUTPATIENT)
Dept: ADMINISTRATIVE | Facility: HOSPITAL | Age: 35
End: 2020-10-05

## 2021-01-04 ENCOUNTER — PATIENT MESSAGE (OUTPATIENT)
Dept: ADMINISTRATIVE | Facility: HOSPITAL | Age: 36
End: 2021-01-04

## 2021-04-05 ENCOUNTER — PATIENT MESSAGE (OUTPATIENT)
Dept: ADMINISTRATIVE | Facility: HOSPITAL | Age: 36
End: 2021-04-05